# Patient Record
Sex: MALE | Race: WHITE | NOT HISPANIC OR LATINO | URBAN - METROPOLITAN AREA
[De-identification: names, ages, dates, MRNs, and addresses within clinical notes are randomized per-mention and may not be internally consistent; named-entity substitution may affect disease eponyms.]

---

## 2020-01-14 ENCOUNTER — ON CAMPUS - OUTPATIENT (OUTPATIENT)
Dept: URBAN - METROPOLITAN AREA HOSPITAL 97 | Facility: HOSPITAL | Age: 70
End: 2020-01-14

## 2020-01-14 DIAGNOSIS — A04.72 ENTEROCOLITIS DUE TO CLOSTRIDIUM DIFFICILE, NOT SPECIFIED AS: ICD-10-CM

## 2020-01-14 DIAGNOSIS — K44.9 DIAPHRAGMATIC HERNIA WITHOUT OBSTRUCTION OR GANGRENE: ICD-10-CM

## 2020-01-14 DIAGNOSIS — K92.1 MELENA: ICD-10-CM

## 2020-01-14 DIAGNOSIS — K29.70 GASTRITIS, UNSPECIFIED, WITHOUT BLEEDING: ICD-10-CM

## 2020-01-14 DIAGNOSIS — K22.10 ULCER OF ESOPHAGUS WITHOUT BLEEDING: ICD-10-CM

## 2020-01-14 PROCEDURE — 43239 EGD BIOPSY SINGLE/MULTIPLE: CPT | Performed by: INTERNAL MEDICINE

## 2020-01-14 PROCEDURE — 99204 OFFICE O/P NEW MOD 45 MIN: CPT | Mod: 25 | Performed by: NURSE PRACTITIONER

## 2020-01-27 ENCOUNTER — OFFICE (OUTPATIENT)
Dept: URBAN - METROPOLITAN AREA CLINIC 11 | Facility: CLINIC | Age: 70
End: 2020-01-27

## 2020-01-27 VITALS
HEIGHT: 73 IN | WEIGHT: 226 LBS | HEART RATE: 73 BPM | DIASTOLIC BLOOD PRESSURE: 55 MMHG | SYSTOLIC BLOOD PRESSURE: 132 MMHG

## 2020-01-27 DIAGNOSIS — K92.2 GASTROINTESTINAL HEMORRHAGE, UNSPECIFIED: ICD-10-CM

## 2020-01-27 DIAGNOSIS — K22.70 BARRETT'S ESOPHAGUS WITHOUT DYSPLASIA: ICD-10-CM

## 2020-01-27 DIAGNOSIS — A04.72 ENTEROCOLITIS DUE TO CLOSTRIDIUM DIFFICILE, NOT SPECIFIED AS: ICD-10-CM

## 2020-01-27 PROCEDURE — 99214 OFFICE O/P EST MOD 30 MIN: CPT | Performed by: INTERNAL MEDICINE

## 2020-01-27 RX ORDER — POLYETHYLENE GLYCOL 3350, SODIUM SULFATE, SODIUM CHLORIDE, POTASSIUM CHLORIDE, ASCORBIC ACID, SODIUM ASCORBATE 140-9-5.2G
KIT ORAL
Qty: 1 | Refills: 0 | Status: COMPLETED
Start: 2020-01-27 | End: 2020-02-20

## 2020-01-27 NOTE — SERVICEHPINOTES
69-year-old white male known to us from the past with colonoscopy 3 years ago with diverticulosis and he also had focal ulceration over the ileocecal valve.  He has been on nonsteroidal anti inflammatory agents for 25 years until recently.  His Hct. dropped from 40-25 over the last year and he had a Hemoccult positive stool but had black stools in December.  He ultimately was hospitalized in January at [facility] and had diarrhea issues also and was reportedly Clostridium difficile positive.  The patient was treated with vancomycin has no further problems with diarrhea and is almost finished with the vancomycin.  Stools are brown and normal currently.  There is no red blood.  In the hospital he was endoscoped and found to have a long segment Couch's esophagus but was not really biopsied except for 1 little area of ulceration at the GE junction.  He had a mildly irregular heart rhythm back in 2002 during a stress  test and was put on verapamil which she continues to take today.  He has had no further problems.  He has no chest pain or shortness of breath.  He quit smoking almost 20 years ago.  He is currently on pantoprazole 40 mg twice daily.  Last week his hematocrit was up to 32%.

## 2020-02-20 ENCOUNTER — AMBULATORY SURGICAL CENTER (OUTPATIENT)
Dept: URBAN - METROPOLITAN AREA SURGERY 3 | Facility: SURGERY | Age: 70
End: 2020-02-20

## 2020-02-20 ENCOUNTER — AMBULATORY SURGICAL CENTER (OUTPATIENT)
Dept: URBAN - METROPOLITAN AREA SURGERY 3 | Facility: SURGERY | Age: 70
End: 2020-02-20
Payer: MEDICARE

## 2020-02-20 ENCOUNTER — OFFICE (OUTPATIENT)
Dept: URBAN - METROPOLITAN AREA PATHOLOGY 22 | Facility: PATHOLOGY | Age: 70
End: 2020-02-20
Payer: MEDICARE

## 2020-02-20 VITALS
HEIGHT: 73 IN | WEIGHT: 208 LBS | TEMPERATURE: 98.1 F | TEMPERATURE: 97.2 F | DIASTOLIC BLOOD PRESSURE: 87 MMHG | OXYGEN SATURATION: 96 % | HEART RATE: 87 BPM | DIASTOLIC BLOOD PRESSURE: 70 MMHG | DIASTOLIC BLOOD PRESSURE: 87 MMHG | RESPIRATION RATE: 19 BRPM | TEMPERATURE: 97.2 F | SYSTOLIC BLOOD PRESSURE: 127 MMHG | SYSTOLIC BLOOD PRESSURE: 158 MMHG | RESPIRATION RATE: 18 BRPM | OXYGEN SATURATION: 95 % | SYSTOLIC BLOOD PRESSURE: 141 MMHG | HEART RATE: 87 BPM | WEIGHT: 208 LBS | HEART RATE: 78 BPM | RESPIRATION RATE: 20 BRPM | OXYGEN SATURATION: 100 % | DIASTOLIC BLOOD PRESSURE: 70 MMHG | RESPIRATION RATE: 19 BRPM | HEART RATE: 80 BPM | SYSTOLIC BLOOD PRESSURE: 149 MMHG | OXYGEN SATURATION: 97 % | RESPIRATION RATE: 19 BRPM | TEMPERATURE: 98.1 F | OXYGEN SATURATION: 96 % | DIASTOLIC BLOOD PRESSURE: 70 MMHG | OXYGEN SATURATION: 97 % | TEMPERATURE: 98.1 F | RESPIRATION RATE: 20 BRPM | DIASTOLIC BLOOD PRESSURE: 62 MMHG | SYSTOLIC BLOOD PRESSURE: 149 MMHG | HEART RATE: 78 BPM | SYSTOLIC BLOOD PRESSURE: 158 MMHG | HEART RATE: 80 BPM | OXYGEN SATURATION: 100 % | HEIGHT: 73 IN | DIASTOLIC BLOOD PRESSURE: 66 MMHG | WEIGHT: 208 LBS | DIASTOLIC BLOOD PRESSURE: 62 MMHG | OXYGEN SATURATION: 95 % | HEIGHT: 73 IN | SYSTOLIC BLOOD PRESSURE: 127 MMHG | DIASTOLIC BLOOD PRESSURE: 73 MMHG | HEART RATE: 87 BPM | HEART RATE: 83 BPM | HEART RATE: 83 BPM | RESPIRATION RATE: 20 BRPM | DIASTOLIC BLOOD PRESSURE: 87 MMHG | SYSTOLIC BLOOD PRESSURE: 141 MMHG | SYSTOLIC BLOOD PRESSURE: 126 MMHG | HEART RATE: 78 BPM | SYSTOLIC BLOOD PRESSURE: 149 MMHG | OXYGEN SATURATION: 97 % | OXYGEN SATURATION: 95 % | OXYGEN SATURATION: 96 % | DIASTOLIC BLOOD PRESSURE: 73 MMHG | SYSTOLIC BLOOD PRESSURE: 127 MMHG | SYSTOLIC BLOOD PRESSURE: 126 MMHG | RESPIRATION RATE: 18 BRPM | RESPIRATION RATE: 18 BRPM | TEMPERATURE: 97.2 F | HEART RATE: 83 BPM | SYSTOLIC BLOOD PRESSURE: 141 MMHG | DIASTOLIC BLOOD PRESSURE: 66 MMHG | SYSTOLIC BLOOD PRESSURE: 126 MMHG | OXYGEN SATURATION: 100 % | HEART RATE: 80 BPM | SYSTOLIC BLOOD PRESSURE: 158 MMHG | DIASTOLIC BLOOD PRESSURE: 73 MMHG | DIASTOLIC BLOOD PRESSURE: 62 MMHG | DIASTOLIC BLOOD PRESSURE: 66 MMHG

## 2020-02-20 DIAGNOSIS — K20.9 ESOPHAGITIS, UNSPECIFIED: ICD-10-CM

## 2020-02-20 DIAGNOSIS — K44.9 DIAPHRAGMATIC HERNIA WITHOUT OBSTRUCTION OR GANGRENE: ICD-10-CM

## 2020-02-20 DIAGNOSIS — K22.70 BARRETT'S ESOPHAGUS WITHOUT DYSPLASIA: ICD-10-CM

## 2020-02-20 DIAGNOSIS — K92.1 MELENA: ICD-10-CM

## 2020-02-20 DIAGNOSIS — K57.30 DIVERTICULOSIS OF LARGE INTESTINE WITHOUT PERFORATION OR ABS: ICD-10-CM

## 2020-02-20 PROCEDURE — G8918 PT W/O PREOP ORDER IV AB PRO: HCPCS | Performed by: INTERNAL MEDICINE

## 2020-02-20 PROCEDURE — 43239 EGD BIOPSY SINGLE/MULTIPLE: CPT | Mod: 51 | Performed by: INTERNAL MEDICINE

## 2020-02-20 PROCEDURE — G8907 PT DOC NO EVENTS ON DISCHARG: HCPCS | Performed by: INTERNAL MEDICINE

## 2020-02-20 PROCEDURE — 45378 DIAGNOSTIC COLONOSCOPY: CPT | Performed by: INTERNAL MEDICINE

## 2020-02-20 PROCEDURE — 88312 SPECIAL STAINS GROUP 1: CPT | Performed by: INTERNAL MEDICINE

## 2020-02-20 PROCEDURE — 3126F ESOPH BX RPRT W/DYSPL INFO: CPT | Performed by: INTERNAL MEDICINE

## 2020-02-20 PROCEDURE — 88305 TISSUE EXAM BY PATHOLOGIST: CPT | Performed by: INTERNAL MEDICINE

## 2020-02-20 NOTE — SERVICEHPINOTES
69-year-old white male known to us from the past with colonoscopy 3 years ago with diverticulosis and he also had focal ulceration over the ileocecal valve. He has been on nonsteroidal anti inflammatory agents for 25 years until recently. His Hct. dropped from 40-25 over the last year and he had a Hemoccult positive stool but had black stools in December. He ultimately was hospitalized in January at [facility] and had diarrhea issues also and was reportedly Clostridium difficile positive. The patient was treated with vancomycin has no further problems with diarrhea and is almost finished with the vancomycin. Stools are brown and normal currently. There is no red blood. In the hospital he was endoscoped and found to have a long segment Couch's esophagus but was not really biopsied except for 1 little area of ulceration at the GE junction.

## 2020-02-20 NOTE — SERVICEHPINOTES
69-year-old white male known to us from the past with colonoscopy 3 years ago with diverticulosis and he also had focal ulceration over the ileocecal valve. He has been on nonsteroidal anti inflammatory agents for 25 years until recently. His Hct. dropped from 40-25 over the last year and he had a Hemoccult positive stool but had black stools in December. He ultimately was hospitalized in January at [facility] and had diarrhea issues also and was reportedly Clostridium difficile positive. The patient was treated with vancomycin has no further problems with diarrhea and is almost finished with the vancomycin. Stools are brown and normal currently. There is no red blood. In the hospital he was endoscoped and found to have a long segment Couch's esophagus but was not really biopsied except for 1 little area of ulceration at the GE junction.

## 2020-02-20 NOTE — SERVICEHPINOTES
69-year-old white male known to us from the past with colonoscopy 3 years ago with diverticulosis and he also had focal ulceration over the ileocecal valve. He has been on nonsteroidal anti inflammatory agents for 25 years until recently. His Hct. dropped from 40-25 over the last year and he had a Hemoccult positive stool but had black stools in December. He ultimately was hospitalized in January at [facility] and had diarrhea issues also and was reportedly Clostridium difficile positive. The patient was treated with vancomycin has no further problems with diarrhea and is almost finished with the vancomycin. Stools are brown and normal currently. There is no red blood. In the hospital he was endoscoped and found to have a long segment Couch's esophagus but was not really biopsied except for 1 little area of ulceration at the GE junction.

## 2025-01-23 ENCOUNTER — OFFICE (OUTPATIENT)
Dept: URBAN - METROPOLITAN AREA CLINIC 11 | Facility: CLINIC | Age: 75
End: 2025-01-23
Payer: MEDICARE

## 2025-01-23 VITALS
DIASTOLIC BLOOD PRESSURE: 80 MMHG | OXYGEN SATURATION: 99 % | HEIGHT: 73 IN | DIASTOLIC BLOOD PRESSURE: 87 MMHG | SYSTOLIC BLOOD PRESSURE: 178 MMHG | WEIGHT: 262 LBS | HEART RATE: 111 BPM | SYSTOLIC BLOOD PRESSURE: 180 MMHG

## 2025-01-23 DIAGNOSIS — K44.9 DIAPHRAGMATIC HERNIA WITHOUT OBSTRUCTION OR GANGRENE: ICD-10-CM

## 2025-01-23 DIAGNOSIS — K22.70 BARRETT'S ESOPHAGUS WITHOUT DYSPLASIA: ICD-10-CM

## 2025-01-23 PROCEDURE — 99214 OFFICE O/P EST MOD 30 MIN: CPT

## 2025-01-23 NOTE — SERVICEHPINOTES
Renan Sarmiento   is a  74   year old   White  male   with a PMH significant for   asthma, colon polyps, COPD, CAD, BPH, gout, hyper tension, hyperlipidemia, esophageal ulcer, AFib, and sleep apnea who presents to [facility] for  surveillance EGD for Couch's esophagus. 
br
br   Clinic Visit   1/23/2025   :
brThe patient was diagnosed with Couch's esophagus in 2018 following the discovery of a bleeding ulcer, attributed to long-term NSAID use. He was switched to gabapentin and has been taking pantoprazole daily. He has no current GI complaints, including acid reflux, abdominal pain, or trouble swallowing. No blood in stool and regular bowel movements occur once or twice daily, typically after coffee.   His last colonoscopy in 2020  revealed sigmoid diverticulosis but was otherwise unremarkable. He has a 10 year colonoscopy recall. No unintentional weight loss, though he has gained weight due to immobility following a hip fracture in April 2023. He has a history of atrial fib and is on Eliquis.  He is a former smoker and he drinks 5-15 alcoholic drinks per week.   No family history of GI malignancies.

## 2025-01-23 NOTE — SERVICENOTES
patient is here for an overdue 3 year surveillance EGD for history of Couch's esophagus.  he continues to take 40 mg pantoprazole daily. will request cardiac clearance from Dr. Ortega to hold Eliquis prior to procedure.   He has no GI complaints today.  counseled him to continue to avoid NSAIDs.  Encouraged a high-fiber diet.  Risks and benefits of the procedure explained he agrees to proceed.  All questions addressed.  Previous records reviewed.

## 2025-04-01 ENCOUNTER — OFFICE (OUTPATIENT)
Dept: URBAN - METROPOLITAN AREA PATHOLOGY 12 | Facility: PATHOLOGY | Age: 75
End: 2025-04-01
Payer: MEDICARE

## 2025-04-01 ENCOUNTER — AMBULATORY SURGICAL CENTER (OUTPATIENT)
Dept: URBAN - METROPOLITAN AREA SURGERY 2 | Facility: SURGERY | Age: 75
End: 2025-04-01
Payer: MEDICARE

## 2025-04-01 VITALS
HEART RATE: 64 BPM | TEMPERATURE: 97.5 F | SYSTOLIC BLOOD PRESSURE: 133 MMHG | SYSTOLIC BLOOD PRESSURE: 105 MMHG | HEART RATE: 64 BPM | DIASTOLIC BLOOD PRESSURE: 69 MMHG | OXYGEN SATURATION: 95 % | HEIGHT: 73 IN | DIASTOLIC BLOOD PRESSURE: 49 MMHG | RESPIRATION RATE: 13 BRPM | RESPIRATION RATE: 13 BRPM | SYSTOLIC BLOOD PRESSURE: 146 MMHG | HEART RATE: 60 BPM | TEMPERATURE: 98.2 F | RESPIRATION RATE: 14 BRPM | SYSTOLIC BLOOD PRESSURE: 146 MMHG | SYSTOLIC BLOOD PRESSURE: 104 MMHG | HEART RATE: 58 BPM | WEIGHT: 253 LBS | TEMPERATURE: 97.5 F | SYSTOLIC BLOOD PRESSURE: 126 MMHG | RESPIRATION RATE: 15 BRPM | HEART RATE: 62 BPM | DIASTOLIC BLOOD PRESSURE: 72 MMHG | OXYGEN SATURATION: 93 % | WEIGHT: 253 LBS | RESPIRATION RATE: 15 BRPM | HEART RATE: 62 BPM | HEIGHT: 73 IN | HEART RATE: 58 BPM | DIASTOLIC BLOOD PRESSURE: 59 MMHG | DIASTOLIC BLOOD PRESSURE: 72 MMHG | DIASTOLIC BLOOD PRESSURE: 49 MMHG | OXYGEN SATURATION: 95 % | OXYGEN SATURATION: 93 % | RESPIRATION RATE: 18 BRPM | SYSTOLIC BLOOD PRESSURE: 126 MMHG | HEART RATE: 61 BPM | SYSTOLIC BLOOD PRESSURE: 105 MMHG | DIASTOLIC BLOOD PRESSURE: 59 MMHG | SYSTOLIC BLOOD PRESSURE: 104 MMHG | SYSTOLIC BLOOD PRESSURE: 133 MMHG | HEART RATE: 60 BPM | HEART RATE: 61 BPM | TEMPERATURE: 98.2 F | RESPIRATION RATE: 14 BRPM | DIASTOLIC BLOOD PRESSURE: 69 MMHG | RESPIRATION RATE: 18 BRPM

## 2025-04-01 DIAGNOSIS — K31.7 POLYP OF STOMACH AND DUODENUM: ICD-10-CM

## 2025-04-01 DIAGNOSIS — K22.70 BARRETT'S ESOPHAGUS WITHOUT DYSPLASIA: ICD-10-CM

## 2025-04-01 DIAGNOSIS — K29.70 GASTRITIS, UNSPECIFIED, WITHOUT BLEEDING: ICD-10-CM

## 2025-04-01 DIAGNOSIS — K22.710 BARRETT'S ESOPHAGUS WITH LOW GRADE DYSPLASIA: ICD-10-CM

## 2025-04-01 DIAGNOSIS — K44.9 DIAPHRAGMATIC HERNIA WITHOUT OBSTRUCTION OR GANGRENE: ICD-10-CM

## 2025-04-01 DIAGNOSIS — K31.89 OTHER DISEASES OF STOMACH AND DUODENUM: ICD-10-CM

## 2025-04-01 PROCEDURE — 88342 IMHCHEM/IMCYTCHM 1ST ANTB: CPT | Performed by: STUDENT IN AN ORGANIZED HEALTH CARE EDUCATION/TRAINING PROGRAM

## 2025-04-01 PROCEDURE — 88341 IMHCHEM/IMCYTCHM EA ADD ANTB: CPT | Performed by: STUDENT IN AN ORGANIZED HEALTH CARE EDUCATION/TRAINING PROGRAM

## 2025-04-01 PROCEDURE — 88313 SPECIAL STAINS GROUP 2: CPT | Performed by: STUDENT IN AN ORGANIZED HEALTH CARE EDUCATION/TRAINING PROGRAM

## 2025-04-01 PROCEDURE — 88305 TISSUE EXAM BY PATHOLOGIST: CPT | Performed by: STUDENT IN AN ORGANIZED HEALTH CARE EDUCATION/TRAINING PROGRAM

## 2025-04-01 PROCEDURE — 43239 EGD BIOPSY SINGLE/MULTIPLE: CPT | Performed by: STUDENT IN AN ORGANIZED HEALTH CARE EDUCATION/TRAINING PROGRAM

## 2025-04-01 NOTE — SERVICEHPINOTES
Renan Sarmiento is a 74 year old  White male with a PMH significant for   asthma, colon polyps, COPD, CAD, BPH, gout, hyper tension, hyperlipidemia, esophageal ulcer, AFib, and sleep apnea who presents to [facility] for  surveillance EGD for Couch's esophagus. Clinic Visit 1/23/2025 :brThe patient was diagnosed with Couch's esophagus in 2018 following the discovery of a bleeding ulcer, attributed to long-term NSAID use. He was switched to gabapentin and has been taking pantoprazole daily. He has no current GI complaints, including acid reflux, abdominal pain, or trouble swallowing. No blood in stool and regular bowel movements occur once or twice daily, typically after coffee.   His last colonoscopy in 2020  revealed sigmoid diverticulosis but was otherwise unremarkable. He has a 10 year colonoscopy recall. No unintentional weight loss, though he has gained weight due to immobility following a hip fracture in April 2023. He has a history of atrial fib and is on Eliquis. He is a former smoker and he drinks 5-15 alcoholic drinks per week.   No family history of GI malignancies.
br
br    EGD 4/1/25:  
brlast dose eliquis was 2 days ago, takes PPI qday, no FHx of GI cancers, no dysphagia.

## 2025-04-01 NOTE — SERVICEHPINOTES
Renan Sarmiento is a 74 year old  White male with a PMH significant for   asthma, colon polyps, COPD, CAD, BPH, gout, hyper tension, hyperlipidemia, esophageal ulcer, AFib, and sleep apnea who presents to [facility] for  surveillance EGD for Couch's esophagus. Clinic Visit 1/23/2025 :brThe patient was diagnosed with Couch's esophagus in 2018 following the discovery of a bleeding ulcer, attributed to long-term NSAID use. He was switched to gabapentin and has been taking pantoprazole daily. He has no current GI complaints, including acid reflux, abdominal pain, or trouble swallowing. No blood in stool and regular bowel movements occur once or twice daily, typically after coffee.   His last colonoscopy in 2020  revealed sigmoid diverticulosis but was otherwise unremarkable. He has a 10 year colonoscopy recall. No unintentional weight loss, though he has gained weight due to immobility following a hip fracture in April 2023. He has a history of atrial fib and is on Eliquis. He is a former smoker and he drinks 5-15 alcoholic drinks per week.   No family history of GI malignancies.
br
br    EGD 4/1/25:  
brlast dose eliquis was 2 days ago, takes PPI qday, no FHx of GI cancers, no dysphagia.

## 2025-04-03 LAB
GASTRO ONE PATHOLOGY: PDF REPORT: (no result)
GASTRO ONE PATHOLOGY: PDF REPORT: (no result)

## 2025-06-04 ENCOUNTER — ON CAMPUS - OUTPATIENT (OUTPATIENT)
Dept: URBAN - METROPOLITAN AREA HOSPITAL 131 | Facility: HOSPITAL | Age: 75
End: 2025-06-04
Payer: MEDICARE

## 2025-06-04 DIAGNOSIS — K22.710 BARRETT'S ESOPHAGUS WITH LOW GRADE DYSPLASIA: ICD-10-CM

## 2025-06-04 PROCEDURE — 43229 ESOPHAGOSCOPY LESION ABLATE: CPT | Performed by: INTERNAL MEDICINE

## 2025-08-20 ENCOUNTER — ON CAMPUS - OUTPATIENT (OUTPATIENT)
Dept: URBAN - METROPOLITAN AREA HOSPITAL 131 | Facility: HOSPITAL | Age: 75
End: 2025-08-20
Payer: MEDICARE

## 2025-08-20 DIAGNOSIS — K22.710 BARRETT'S ESOPHAGUS WITH LOW GRADE DYSPLASIA: ICD-10-CM

## 2025-08-20 PROCEDURE — 43229 ESOPHAGOSCOPY LESION ABLATE: CPT | Performed by: INTERNAL MEDICINE
